# Patient Record
Sex: FEMALE | Race: WHITE | NOT HISPANIC OR LATINO | URBAN - METROPOLITAN AREA
[De-identification: names, ages, dates, MRNs, and addresses within clinical notes are randomized per-mention and may not be internally consistent; named-entity substitution may affect disease eponyms.]

---

## 2022-05-10 ENCOUNTER — APPOINTMENT (RX ONLY)
Dept: URBAN - METROPOLITAN AREA CLINIC 51 | Facility: CLINIC | Age: 27
Setting detail: DERMATOLOGY
End: 2022-05-10

## 2022-05-10 DIAGNOSIS — L40.0 PSORIASIS VULGARIS: ICD-10-CM | Status: WORSENING

## 2022-05-10 PROBLEM — L30.9 DERMATITIS, UNSPECIFIED: Status: ACTIVE | Noted: 2022-05-10

## 2022-05-10 PROCEDURE — ? TREATMENT REGIMEN

## 2022-05-10 PROCEDURE — ? PRESCRIPTION

## 2022-05-10 PROCEDURE — ? COUNSELING

## 2022-05-10 PROCEDURE — 99203 OFFICE O/P NEW LOW 30 MIN: CPT

## 2022-05-10 RX ORDER — TRIAMCINOLONE ACETONIDE 1 MG/G
CREAM TOPICAL BID
Qty: 454 | Refills: 1 | Status: ERX | COMMUNITY
Start: 2022-05-10

## 2022-05-10 RX ADMIN — TRIAMCINOLONE ACETONIDE: 1 CREAM TOPICAL at 00:00

## 2022-05-10 ASSESSMENT — LOCATION SIMPLE DESCRIPTION DERM: LOCATION SIMPLE: LEFT PRETIBIAL REGION

## 2022-05-10 ASSESSMENT — PGA PSORIASIS: PGA PSORIASIS 2020: MILD

## 2022-05-10 ASSESSMENT — LOCATION DETAILED DESCRIPTION DERM: LOCATION DETAILED: LEFT PROXIMAL PRETIBIAL REGION

## 2022-05-10 ASSESSMENT — LOCATION ZONE DERM: LOCATION ZONE: LEG

## 2022-05-10 NOTE — PROCEDURE: TREATMENT REGIMEN
Action 2: Continue
Detail Level: Zone
Start Regimen: Triamcinolone - apply to affected area on leg twice daily for 2 weeks. Then 1-2 times weekly

## 2022-05-10 NOTE — HPI: RASH
How Severe Is Your Rash?: moderate
Is This A New Presentation, Or A Follow-Up?: Rash
Additional History: Patient states she has not had Covid or the Covid vaccine

## 2022-06-02 ENCOUNTER — APPOINTMENT (RX ONLY)
Dept: URBAN - METROPOLITAN AREA CLINIC 51 | Facility: CLINIC | Age: 27
Setting detail: DERMATOLOGY
End: 2022-06-02

## 2022-06-02 DIAGNOSIS — L40.0 PSORIASIS VULGARIS: ICD-10-CM | Status: IMPROVED

## 2022-06-02 PROBLEM — L30.9 DERMATITIS, UNSPECIFIED: Status: ACTIVE | Noted: 2022-06-02

## 2022-06-02 PROCEDURE — ? ADDITIONAL NOTES

## 2022-06-02 PROCEDURE — ? TREATMENT REGIMEN

## 2022-06-02 PROCEDURE — 99213 OFFICE O/P EST LOW 20 MIN: CPT

## 2022-06-02 PROCEDURE — ? PRESCRIPTION

## 2022-06-02 PROCEDURE — ? COUNSELING

## 2022-06-02 RX ORDER — CLOBETASOL PROPIONATE 0.5 MG/G
CREAM TOPICAL BID
Qty: 60 | Refills: 0 | Status: ERX | COMMUNITY
Start: 2022-06-02

## 2022-06-02 RX ADMIN — CLOBETASOL PROPIONATE: 0.5 CREAM TOPICAL at 00:00

## 2022-06-02 ASSESSMENT — PGA PSORIASIS: PGA PSORIASIS 2020: MILD

## 2022-06-02 ASSESSMENT — LOCATION DETAILED DESCRIPTION DERM: LOCATION DETAILED: LEFT PROXIMAL PRETIBIAL REGION

## 2022-06-02 ASSESSMENT — LOCATION ZONE DERM: LOCATION ZONE: LEG

## 2022-06-02 ASSESSMENT — LOCATION SIMPLE DESCRIPTION DERM: LOCATION SIMPLE: LEFT PRETIBIAL REGION

## 2022-06-02 NOTE — PROCEDURE: ADDITIONAL NOTES
Render Risk Assessment In Note?: no
Detail Level: Simple
Additional Notes: Patient had some improvement with TAC. Superior portion of the dermatitis resolved, however the inferior portion did not. I will have her try clobetasol for two weeks, if not improvement then I will biopsy at her next visit.

## 2022-06-02 NOTE — PROCEDURE: TREATMENT REGIMEN
Action 2: Continue
Detail Level: Zone
Other Instructions: Patient was advised that if she’s not better after using the Clobetasol then we’ll biopsy next time. Patient will follow up in 3 weeks.
Start Regimen: Clobetasol 0.05% topical cream - Apply to affected areas twice daily for two weeks then may use two times weekly for maintenance